# Patient Record
Sex: MALE | Race: BLACK OR AFRICAN AMERICAN | NOT HISPANIC OR LATINO | URBAN - METROPOLITAN AREA
[De-identification: names, ages, dates, MRNs, and addresses within clinical notes are randomized per-mention and may not be internally consistent; named-entity substitution may affect disease eponyms.]

---

## 2023-05-20 ENCOUNTER — OFFICE VISIT (OUTPATIENT)
Dept: URGENT CARE | Facility: CLINIC | Age: 23
End: 2023-05-20
Payer: COMMERCIAL

## 2023-05-20 VITALS
RESPIRATION RATE: 18 BRPM | HEART RATE: 65 BPM | HEIGHT: 71 IN | DIASTOLIC BLOOD PRESSURE: 72 MMHG | OXYGEN SATURATION: 97 % | WEIGHT: 210 LBS | BODY MASS INDEX: 29.4 KG/M2 | TEMPERATURE: 97 F | SYSTOLIC BLOOD PRESSURE: 121 MMHG

## 2023-05-20 DIAGNOSIS — S93.401A SPRAIN OF RIGHT ANKLE, UNSPECIFIED LIGAMENT, INITIAL ENCOUNTER: Primary | ICD-10-CM

## 2023-05-20 PROCEDURE — 99204 OFFICE O/P NEW MOD 45 MIN: CPT | Mod: S$GLB,,, | Performed by: PHYSICIAN ASSISTANT

## 2023-05-20 PROCEDURE — 99204 PR OFFICE/OUTPT VISIT, NEW, LEVL IV, 45-59 MIN: ICD-10-PCS | Mod: S$GLB,,, | Performed by: PHYSICIAN ASSISTANT

## 2023-05-20 RX ORDER — NAPROXEN 500 MG/1
500 TABLET ORAL 2 TIMES DAILY WITH MEALS
Qty: 14 TABLET | Refills: 0 | Status: SHIPPED | OUTPATIENT
Start: 2023-05-20 | End: 2023-05-27

## 2023-05-20 NOTE — PATIENT INSTRUCTIONS
Go to [facility] location ([address]) for xray. We will contact you with results.    Ice, elevate, take prescription naproxen for pain. Keep in ACE bandage until swelling and pain have improved. Crutches as needed but you can weight bear as tolerated.

## 2023-05-20 NOTE — PROGRESS NOTES
"Subjective:      Patient ID: Larry Hernandez is a 22 y.o. male.    Vitals:  height is 5' 11" (1.803 m) and weight is 95.3 kg (210 lb). His tympanic temperature is 97 °F (36.1 °C). His blood pressure is 121/72 and his pulse is 65. His respiration is 18 and oxygen saturation is 97%.     Chief Complaint: Ankle Injury    Patient states he was playing basketball this morning and twisted his Rt ankle. He inverted it. C/o pain and swelling to medial ankle. Able to weight bear but causes pain     Ankle Injury   The incident occurred 1 to 3 hours ago. The incident occurred at the gym. The injury mechanism was a twisting injury. The pain is present in the right ankle. The quality of the pain is described as aching. The pain is at a severity of 6/10. The pain is mild. The pain has been Fluctuating since onset. Pertinent negatives include no inability to bear weight, loss of motion, loss of sensation, muscle weakness, numbness or tingling. He reports no foreign bodies present. The symptoms are aggravated by movement and weight bearing. He has tried nothing for the symptoms.     Constitution: Negative for chills, sweating, fatigue and fever.   Musculoskeletal:  Positive for pain, trauma, joint pain and joint swelling. Negative for abnormal ROM of joint.   Skin:  Negative for rash and wound.   Neurological:  Negative for numbness and tingling.    Objective:     Physical Exam   Constitutional: He is oriented to person, place, and time. He appears well-developed. He is cooperative.   HENT:   Head: Normocephalic and atraumatic.   Ears:   Right Ear: Hearing, external ear and ear canal normal.   Left Ear: Hearing, tympanic membrane, external ear and ear canal normal.   Nose: Nose normal. No mucosal edema or nasal deformity. No epistaxis. Right sinus exhibits no maxillary sinus tenderness and no frontal sinus tenderness. Left sinus exhibits no maxillary sinus tenderness and no frontal sinus tenderness.   Mouth/Throat: Uvula is " midline, oropharynx is clear and moist and mucous membranes are normal. No trismus in the jaw. Normal dentition. No uvula swelling.   Eyes: Conjunctivae and lids are normal.   Neck: Trachea normal and phonation normal. Neck supple.   Cardiovascular: Normal rate, regular rhythm, normal heart sounds and normal pulses.   Pulses:       Dorsalis pedis pulses are 2+ on the right side.        Posterior tibial pulses are 2+ on the right side.   Pulmonary/Chest: Effort normal and breath sounds normal.   Abdominal: Normal appearance and bowel sounds are normal. Soft.   Musculoskeletal: Normal range of motion.         General: Normal range of motion.      Comments: Right medial malleolus mild bony TTP. Mild swelling. FROM no deformities. Normal sensation and 2+ DP PT pulses distally. No laxity    Neurological: He is alert and oriented to person, place, and time. He exhibits normal muscle tone.   Skin: Skin is warm, dry and intact.   Psychiatric: His speech is normal and behavior is normal. Judgment and thought content normal.   Nursing note and vitals reviewed.    Assessment:     1. Sprain of right ankle, unspecified ligament, initial encounter        Plan:     Explained that we have no xray tech at this location today. To report to [facility] location for xray and we would contact him with results. Recommend RICE, naproxen rx. Placed in ace bandage and given crutches.    Sprain of right ankle, unspecified ligament, initial encounter  -     XR ANKLE COMPLETE 3 VIEW RIGHT; Future; Expected date: 05/20/2023  -     naproxen (NAPROSYN) 500 MG tablet; Take 1 tablet (500 mg total) by mouth 2 (two) times daily with meals. for 7 days  Dispense: 14 tablet; Refill: 0  -     CRUTCHES FOR HOME USE      Lara Asif PA-C  [facility]       Patient Instructions   Go to [facility] location ([address]) for xray. We will contact you with results.    Ice, elevate, take  prescription naproxen for pain. Keep in ACE bandage until swelling and pain have improved. Crutches as needed but you can weight bear as tolerated.

## 2023-05-20 NOTE — LETTER
May 20, 2023      [facility]  [address] [place] [address] [address]  [place] [address]  Phone: [phone]  Fax: [phone]       Patient: Larry Hernandez   YOB: 2000  Date of Visit: 05/20/2023    To Whom It May Concern:    Francisco Hernandez  was at [facility] on 05/20/2023. The patient may return to work/school on 05/23/23 with restrictions to take more frequent breaks to ice and elevate as needed. If you have any questions or concerns, or if I can be of further assistance, please do not hesitate to contact me.    Sincerely,    Lara Asif PA-C